# Patient Record
Sex: MALE | Race: WHITE | NOT HISPANIC OR LATINO | Employment: OTHER | URBAN - NONMETROPOLITAN AREA
[De-identification: names, ages, dates, MRNs, and addresses within clinical notes are randomized per-mention and may not be internally consistent; named-entity substitution may affect disease eponyms.]

---

## 2018-02-22 ENCOUNTER — DOCUMENTATION ONLY (OUTPATIENT)
Dept: FAMILY MEDICINE | Facility: OTHER | Age: 43
End: 2018-02-22

## 2021-12-01 ENCOUNTER — OFFICE VISIT (OUTPATIENT)
Dept: FAMILY MEDICINE | Facility: OTHER | Age: 46
End: 2021-12-01
Attending: FAMILY MEDICINE
Payer: COMMERCIAL

## 2021-12-01 VITALS
BODY MASS INDEX: 33.49 KG/M2 | HEIGHT: 71 IN | DIASTOLIC BLOOD PRESSURE: 80 MMHG | HEART RATE: 65 BPM | RESPIRATION RATE: 20 BRPM | TEMPERATURE: 97.8 F | WEIGHT: 239.2 LBS | OXYGEN SATURATION: 96 % | SYSTOLIC BLOOD PRESSURE: 120 MMHG

## 2021-12-01 DIAGNOSIS — L03.319 CELLULITIS AND ABSCESS OF TRUNK: Primary | ICD-10-CM

## 2021-12-01 DIAGNOSIS — L02.219 CELLULITIS AND ABSCESS OF TRUNK: Primary | ICD-10-CM

## 2021-12-01 PROCEDURE — 10060 I&D ABSCESS SIMPLE/SINGLE: CPT | Performed by: FAMILY MEDICINE

## 2021-12-01 ASSESSMENT — PAIN SCALES - GENERAL: PAINLEVEL: NO PAIN (0)

## 2021-12-01 ASSESSMENT — MIFFLIN-ST. JEOR: SCORE: 1987.13

## 2021-12-01 NOTE — NURSING NOTE
Patient here for a cyst that is between in shoulder blades. He had this cyst removed 5 years prior. Medication Reconciliation: complete.    Margarette Merritt LPN  12/1/2021 11:18 AM

## 2021-12-02 NOTE — PROGRESS NOTES
"  SUBJECTIVE:   Merlin Crawford is a 46 year old male who presents to clinic today for the following health issues: Reoccurring cyst    Patient arrives here for recurrent cyst.  Middle of his back.  He states occasionally is very tender.  He has had a removed on 1 occasions.  He has also had a few incision I&D's        Patient Active Problem List    Diagnosis Date Noted     Visit for suture removal 02/08/2016     Priority: Medium     Cyst of skin 01/26/2016     Priority: Medium     Neck mass 01/26/2016     Priority: Medium     Elevated BP 09/17/2014     Priority: Medium     Polyarticular arthritis 09/17/2014     Priority: Medium     Closed fracture of tibial plateau 01/01/2014     Priority: Medium     No past medical history on file.     Review of Systems     OBJECTIVE:     /80   Pulse 65   Temp 97.8  F (36.6  C)   Resp 20   Ht 1.803 m (5' 11\")   Wt 108.5 kg (239 lb 3.2 oz)   SpO2 96%   BMI 33.36 kg/m    Body mass index is 33.36 kg/m .  Physical Exam  Skin:     Comments: Patient has a 1 cm fluctuant mass between his shoulder blades.  Tender to palpate.   Neurological:      Mental Status: He is alert.         Diagnostic Test Results:  none     ASSESSMENT/PLAN:         (L03.319,  L02.219) Cellulitis and abscess of trunk  (primary encounter diagnosis)  Comment: Area was prepped with ChloraPrep.  Anesthesia obtained with lidocaine.  A stab incision was made into the abscess.  Pus along with sebum was removed.  Iodoform gauze was placed.  Patient was advised to remove it in a day or 2.  Plan: DRAIN SKIN ABSCESS SIMPLE/SINGLE              Jace Jiang MD  [facility]  "